# Patient Record
Sex: FEMALE | Race: WHITE | NOT HISPANIC OR LATINO | URBAN - METROPOLITAN AREA
[De-identification: names, ages, dates, MRNs, and addresses within clinical notes are randomized per-mention and may not be internally consistent; named-entity substitution may affect disease eponyms.]

---

## 2023-07-27 ENCOUNTER — EMERGENCY (EMERGENCY)
Facility: HOSPITAL | Age: 23
LOS: 1 days | Discharge: HOME | End: 2023-07-27
Attending: EMERGENCY MEDICINE | Admitting: EMERGENCY MEDICINE
Payer: COMMERCIAL

## 2023-07-27 VITALS
OXYGEN SATURATION: 98 % | HEART RATE: 108 BPM | RESPIRATION RATE: 18 BRPM | WEIGHT: 95.9 LBS | DIASTOLIC BLOOD PRESSURE: 73 MMHG | HEIGHT: 62 IN | SYSTOLIC BLOOD PRESSURE: 113 MMHG | TEMPERATURE: 98 F

## 2023-07-27 VITALS
TEMPERATURE: 98 F | DIASTOLIC BLOOD PRESSURE: 75 MMHG | HEART RATE: 104 BPM | OXYGEN SATURATION: 97 % | RESPIRATION RATE: 18 BRPM | SYSTOLIC BLOOD PRESSURE: 107 MMHG

## 2023-07-27 DIAGNOSIS — Z86.79 PERSONAL HISTORY OF OTHER DISEASES OF THE CIRCULATORY SYSTEM: ICD-10-CM

## 2023-07-27 DIAGNOSIS — Z88.0 ALLERGY STATUS TO PENICILLIN: ICD-10-CM

## 2023-07-27 DIAGNOSIS — R00.0 TACHYCARDIA, UNSPECIFIED: ICD-10-CM

## 2023-07-27 DIAGNOSIS — R42 DIZZINESS AND GIDDINESS: ICD-10-CM

## 2023-07-27 DIAGNOSIS — R07.89 OTHER CHEST PAIN: ICD-10-CM

## 2023-07-27 DIAGNOSIS — R55 SYNCOPE AND COLLAPSE: ICD-10-CM

## 2023-07-27 DIAGNOSIS — R25.1 TREMOR, UNSPECIFIED: ICD-10-CM

## 2023-07-27 LAB
ALBUMIN SERPL ELPH-MCNC: 3.9 G/DL — SIGNIFICANT CHANGE UP (ref 3.3–5)
ALP SERPL-CCNC: 57 U/L — SIGNIFICANT CHANGE UP (ref 40–120)
ALT FLD-CCNC: 9 U/L — LOW (ref 10–45)
ANION GAP SERPL CALC-SCNC: 8 MMOL/L — SIGNIFICANT CHANGE UP (ref 5–17)
AST SERPL-CCNC: 17 U/L — SIGNIFICANT CHANGE UP (ref 10–40)
BASOPHILS # BLD AUTO: 0.03 K/UL — SIGNIFICANT CHANGE UP (ref 0–0.2)
BASOPHILS NFR BLD AUTO: 0.5 % — SIGNIFICANT CHANGE UP (ref 0–2)
BILIRUB SERPL-MCNC: 0.3 MG/DL — SIGNIFICANT CHANGE UP (ref 0.2–1.2)
BUN SERPL-MCNC: 5 MG/DL — LOW (ref 7–23)
CALCIUM SERPL-MCNC: 8.4 MG/DL — SIGNIFICANT CHANGE UP (ref 8.4–10.5)
CHLORIDE SERPL-SCNC: 108 MMOL/L — SIGNIFICANT CHANGE UP (ref 96–108)
CO2 SERPL-SCNC: 24 MMOL/L — SIGNIFICANT CHANGE UP (ref 22–31)
CREAT SERPL-MCNC: 0.6 MG/DL — SIGNIFICANT CHANGE UP (ref 0.5–1.3)
D DIMER BLD IA.RAPID-MCNC: <150 NG/ML DDU — SIGNIFICANT CHANGE UP
EGFR: 132 ML/MIN/1.73M2 — SIGNIFICANT CHANGE UP
EOSINOPHIL # BLD AUTO: 0.04 K/UL — SIGNIFICANT CHANGE UP (ref 0–0.5)
EOSINOPHIL NFR BLD AUTO: 0.6 % — SIGNIFICANT CHANGE UP (ref 0–6)
GLUCOSE SERPL-MCNC: 113 MG/DL — HIGH (ref 70–99)
HCG UR QL: NEGATIVE — SIGNIFICANT CHANGE UP
HCT VFR BLD CALC: 37.3 % — SIGNIFICANT CHANGE UP (ref 34.5–45)
HGB BLD-MCNC: 12.4 G/DL — SIGNIFICANT CHANGE UP (ref 11.5–15.5)
IMM GRANULOCYTES NFR BLD AUTO: 0.3 % — SIGNIFICANT CHANGE UP (ref 0–0.9)
LYMPHOCYTES # BLD AUTO: 1.09 K/UL — SIGNIFICANT CHANGE UP (ref 1–3.3)
LYMPHOCYTES # BLD AUTO: 17.5 % — SIGNIFICANT CHANGE UP (ref 13–44)
MAGNESIUM SERPL-MCNC: 1.8 MG/DL — SIGNIFICANT CHANGE UP (ref 1.6–2.6)
MCHC RBC-ENTMCNC: 31.8 PG — SIGNIFICANT CHANGE UP (ref 27–34)
MCHC RBC-ENTMCNC: 33.2 GM/DL — SIGNIFICANT CHANGE UP (ref 32–36)
MCV RBC AUTO: 95.6 FL — SIGNIFICANT CHANGE UP (ref 80–100)
MONOCYTES # BLD AUTO: 0.46 K/UL — SIGNIFICANT CHANGE UP (ref 0–0.9)
MONOCYTES NFR BLD AUTO: 7.4 % — SIGNIFICANT CHANGE UP (ref 2–14)
NEUTROPHILS # BLD AUTO: 4.59 K/UL — SIGNIFICANT CHANGE UP (ref 1.8–7.4)
NEUTROPHILS NFR BLD AUTO: 73.7 % — SIGNIFICANT CHANGE UP (ref 43–77)
NRBC # BLD: 0 /100 WBCS — SIGNIFICANT CHANGE UP (ref 0–0)
PLATELET # BLD AUTO: 253 K/UL — SIGNIFICANT CHANGE UP (ref 150–400)
POTASSIUM SERPL-MCNC: 4 MMOL/L — SIGNIFICANT CHANGE UP (ref 3.5–5.3)
POTASSIUM SERPL-SCNC: 4 MMOL/L — SIGNIFICANT CHANGE UP (ref 3.5–5.3)
PROT SERPL-MCNC: 6.3 G/DL — SIGNIFICANT CHANGE UP (ref 6–8.3)
RBC # BLD: 3.9 M/UL — SIGNIFICANT CHANGE UP (ref 3.8–5.2)
RBC # FLD: 11.9 % — SIGNIFICANT CHANGE UP (ref 10.3–14.5)
SODIUM SERPL-SCNC: 140 MMOL/L — SIGNIFICANT CHANGE UP (ref 135–145)
WBC # BLD: 6.23 K/UL — SIGNIFICANT CHANGE UP (ref 3.8–10.5)
WBC # FLD AUTO: 6.23 K/UL — SIGNIFICANT CHANGE UP (ref 3.8–10.5)

## 2023-07-27 PROCEDURE — 81025 URINE PREGNANCY TEST: CPT

## 2023-07-27 PROCEDURE — 36415 COLL VENOUS BLD VENIPUNCTURE: CPT

## 2023-07-27 PROCEDURE — 80053 COMPREHEN METABOLIC PANEL: CPT

## 2023-07-27 PROCEDURE — 85025 COMPLETE CBC W/AUTO DIFF WBC: CPT

## 2023-07-27 PROCEDURE — 99284 EMERGENCY DEPT VISIT MOD MDM: CPT | Mod: 25

## 2023-07-27 PROCEDURE — 83735 ASSAY OF MAGNESIUM: CPT

## 2023-07-27 PROCEDURE — 99285 EMERGENCY DEPT VISIT HI MDM: CPT

## 2023-07-27 PROCEDURE — 85379 FIBRIN DEGRADATION QUANT: CPT

## 2023-07-27 PROCEDURE — 93005 ELECTROCARDIOGRAM TRACING: CPT

## 2023-07-27 RX ORDER — SODIUM CHLORIDE 9 MG/ML
1000 INJECTION INTRAMUSCULAR; INTRAVENOUS; SUBCUTANEOUS ONCE
Refills: 0 | Status: COMPLETED | OUTPATIENT
Start: 2023-07-27 | End: 2023-07-27

## 2023-07-27 RX ADMIN — SODIUM CHLORIDE 1000 MILLILITER(S): 9 INJECTION INTRAMUSCULAR; INTRAVENOUS; SUBCUTANEOUS at 20:55

## 2023-07-27 NOTE — ED PROVIDER NOTE - OBJECTIVE STATEMENT
22-year-old female was at a museum with her family and suddenly felt dizzy with chest pain heart racing felt shaky.  These are symptoms she typically has with POTS something she was diagnosed with a few months ago.  They were worse than usual.  However patient has not had much to eat or drink today she has been walking around a lot in the past few days while she has been in the city.  Will have her call EMS had reported her heart rate was very high approximately 160.  Patient does not know what her prior heart rate is with these episodes has been.  However, she had Holter monitors in February and March that were negative for any arrhythmias during episodes.     At present, she is feeling much better although still not back to baseline.  Her heart rate is in the high 90s when she sits up it goes to 114 she feels a little bit dizzy.

## 2023-07-27 NOTE — ED PROVIDER NOTE - NSFOLLOWUPINSTRUCTIONS_ED_ALL_ED_FT
REST AND KEEP WELL HYDRATED    GET UP SLOWLY FROM LYING AND SITTING POSITIONS    RETURN TO THE EMERGENCY ROOM IMMEDIATELY FOR:  FEVER  VOMITING  RETURN OF CHEST PAIN OR FAST HEART RATE OR DIZZINESS  YOU PASS OUT  ANY NEW, WORSENING OR CONCERNING SYMPTOMS

## 2023-07-27 NOTE — ED PROVIDER NOTE - PATIENT PORTAL LINK FT
You can access the FollowMyHealth Patient Portal offered by [facility] by registering at the following website: [email or web address]. By joining [facility]’s FollowMyHealth portal, you will also be able to view your health information using other applications (apps) compatible with our system.

## 2023-07-27 NOTE — ED PROVIDER NOTE - CLINICAL SUMMARY MEDICAL DECISION MAKING FREE TEXT BOX
22-year-old female with history of POTS had an episode typical of her syndrome prior to arrival.  Her it was very elevated according to EMS but it is much better now.  She still has some postural tachycardia and so we will give fluids and reassess. 22-year-old female with history of POTS had an episode typical of her syndrome prior to arrival.  Her it was very elevated according to EMS but it is much better now.  She still has some postural tachycardia and so we will give fluids and reassess.    2300: Status post 2 L of fluid patient is feeling much better.  She is able to walk to and from the bathroom.  She said her legs are still a little shaky but overall she is much better.  Her color is good.  She is mildly tachycardic but has been so on and off since arrival.  Her father is with her and feels safe with plan for discharge.  Reviewed that tomorrow she should rest stay out of the heat and drink plenty of fluids.  Return precautions reviewed patient and father verbalized understanding discussion and plan.

## 2023-07-27 NOTE — ED ADULT NURSE NOTE - OTHER COMPLAINTS
Per EMS, patient's heart rate was initially in the 160's and /74. IV#20g noted to left antecubital, placed by EMS and IV fluids infusing. Patient also reports chest pain.

## 2023-07-27 NOTE — ED ADULT NURSE NOTE - OBJECTIVE STATEMENT
21 yo F PMHx POTS presents to ED co dizziness, CP, and "feeling shaky." Pt awake and alert, AOx4. Pt reports she was on the way to the museum today with her family and she was on the train when suddenly she felt dizzy and like her heart was racing. Pt reports these are usual sx for her POTS which she was diagnosed with 4 months ago. Pt reports EMS told her her HR was 160. Pt currently feels a little bit better, but still co CP and lightheadedness. Pt also reports she is still a little bit dizzy. Pt denies SOB, N/V/D, f/c, numbness, tingling, ha, vision changes. Pt placed on CCM.

## 2023-07-27 NOTE — ED PROVIDER NOTE - PHYSICAL EXAMINATION
General:  Well appearing, no distress  HEENT:  No conjunctival injection, neck supple, no congestion   Chest:  Non-tender, no crepitance  Lungs:  Clear to auscultation bilaterally   Heart:  s1s2 normal, no murmur   Abd:  soft, non-tender, non-distended  :  Deferred  Rectal:  Deferred  Extremities: No edema, normal perfusion, no joint swelling or tenderness  Neuro:  Alert, conversant, motor/sensory grossly intact   Psychiatry:  Calm, cooperative, no expression of suicidal or homicidal ideation

## 2023-07-27 NOTE — ED ADULT TRIAGE NOTE - OTHER COMPLAINTS
Per EMS, patient's heart rate was initially 160's and /74. IV#20g noted to left antecubital, placed by EMS and IV fluids infusing. Patient also reports chest pain. Per EMS, patient's heart rate was initially in the 160's and /74. IV#20g noted to left antecubital, placed by EMS and IV fluids infusing. Patient also reports chest pain.

## 2023-07-27 NOTE — ED ADULT NURSE NOTE - NSFALLRISKINTERV_ED_ALL_ED

## 2024-09-09 NOTE — ED ADULT NURSE NOTE - CAS TRG GENERAL AIRWAY, MLM
Patent Explanation (Does Not Render In The Note): A drug that requires intensive monitoring is a therapeutic agent that has the potential to cause serious morbidity or death. The monitoring is performed for assessment of these adverse effects and not primarily for assessment of therapeutic efficacy. The monitoring should be that which is generally accepted practice for the agent, but may be patient specific in some cases. Monitoring by history or examination does not qualify. Examples of monitoring that does not qualify include monitoring glucose levels during insulin therapy as the primary reason is the therapeutic effect or annual electrolytes and renal function for a patient on a diuretic as the frequency does not meet the threshold. Discussion: Do not share medication. Patient should not take vitamin A or multivitamin while on isotretinoin. Pt should not become pregnant while on medication. Drug Being Monitored: isotretinoin